# Patient Record
Sex: FEMALE | Race: WHITE | NOT HISPANIC OR LATINO | Employment: UNEMPLOYED | URBAN - METROPOLITAN AREA
[De-identification: names, ages, dates, MRNs, and addresses within clinical notes are randomized per-mention and may not be internally consistent; named-entity substitution may affect disease eponyms.]

---

## 2020-01-16 ENCOUNTER — OFFICE VISIT (OUTPATIENT)
Dept: PEDIATRIC CARDIOLOGY | Facility: CLINIC | Age: 30
End: 2020-01-16
Payer: MEDICAID

## 2020-01-16 DIAGNOSIS — O36.8390 FETAL ARRHYTHMIA AFFECTING PREGNANCY, ANTEPARTUM: Primary | ICD-10-CM

## 2020-01-16 PROCEDURE — 99499 NO LOS: ICD-10-PCS | Mod: S$GLB,,, | Performed by: NURSE PRACTITIONER

## 2020-01-16 PROCEDURE — 99499 UNLISTED E&M SERVICE: CPT | Mod: S$GLB,,, | Performed by: NURSE PRACTITIONER

## 2020-01-16 NOTE — LETTER
January 24, 2020      Baudilio Mcpherson MD  [address] [facility] [address]           [facility]  [address]  [facility] [address]  Phone: [phone]  Fax: [phone]          Patient: Ministerio Gaitan   MR Number: [id number]   YOB: 1990   Date of Visit: 1/16/2020       Dear Dr. Baudilio Mcpherson:    Thank you for referring Ministerio Gaitan to me for evaluation. Attached you will find relevant portions of my assessment and plan of care.    If you have questions, please do not hesitate to call me. I look forward to following Ministerio Gaitan along with you.    Sincerely,    Laureen Ellis, MELVIN    Enclosure  CC:  No Recipients    If you would like to receive this communication electronically, please contact [email or web address] or [phone] to request more information on [facility] Link access.    For providers and/or their staff who would like to refer a patient to [facility], please contact us through our one-stop-shop provider referral line, [facility], at [phone].    If you feel you have received this communication in error or would no longer like to receive these types of communications, please e-mail [email or web address]

## 2020-01-24 PROBLEM — O36.8390 FETAL ARRHYTHMIA AFFECTING PREGNANCY, ANTEPARTUM: Status: ACTIVE | Noted: 2019-12-27

## 2020-01-24 NOTE — PROGRESS NOTES
Dr. Giron spoke with Mrs. Gaitan in consult regarding fetal diagnosis. Fetus was noted to have SVT and was seen by perinatology. She was started on digoxin and flecainide with return to sinus rhythm in fetus. There has been concern for hydrops with prolonged tachycardia with some suggestion of pre-hydrops. Initially, arrangements were being made for her to deliver in [place]; however, perinatologist spoke with Dr. Giron who agrees to the baby being delivered here. We have informed the nursery and the NICU at [facility] of anticipated delivery between Feb 4-10, 2020. The infant will need an EKG post delivery as well as a digoxin and flecainide level.